# Patient Record
Sex: FEMALE | Race: OTHER | NOT HISPANIC OR LATINO | URBAN - METROPOLITAN AREA
[De-identification: names, ages, dates, MRNs, and addresses within clinical notes are randomized per-mention and may not be internally consistent; named-entity substitution may affect disease eponyms.]

---

## 2024-06-16 ENCOUNTER — EMERGENCY (EMERGENCY)
Facility: HOSPITAL | Age: 54
LOS: 1 days | Discharge: HOME | End: 2024-06-16
Attending: STUDENT IN AN ORGANIZED HEALTH CARE EDUCATION/TRAINING PROGRAM
Payer: COMMERCIAL

## 2024-06-16 VITALS
SYSTOLIC BLOOD PRESSURE: 169 MMHG | TEMPERATURE: 98 F | DIASTOLIC BLOOD PRESSURE: 90 MMHG | HEART RATE: 74 BPM | OXYGEN SATURATION: 100 % | RESPIRATION RATE: 18 BRPM

## 2024-06-17 VITALS
SYSTOLIC BLOOD PRESSURE: 157 MMHG | TEMPERATURE: 98 F | HEART RATE: 66 BPM | OXYGEN SATURATION: 100 % | RESPIRATION RATE: 18 BRPM | DIASTOLIC BLOOD PRESSURE: 84 MMHG

## 2024-06-17 RX ORDER — SODIUM CHLORIDE 9 MG/ML
1000 INJECTION INTRAMUSCULAR; INTRAVENOUS; SUBCUTANEOUS ONCE
Refills: 0 | Status: COMPLETED | OUTPATIENT
Start: 2024-06-17 | End: 2024-06-17

## 2024-06-17 RX ORDER — ACETAMINOPHEN 500 MG
650 TABLET ORAL ONCE
Refills: 0 | Status: COMPLETED | OUTPATIENT
Start: 2024-06-17 | End: 2024-06-17

## 2024-06-17 RX ORDER — METOCLOPRAMIDE HCL 10 MG
10 TABLET ORAL ONCE
Refills: 0 | Status: COMPLETED | OUTPATIENT
Start: 2024-06-17 | End: 2024-06-17

## 2024-06-17 RX ORDER — KETOROLAC TROMETHAMINE 30 MG/ML
30 SYRINGE (ML) INJECTION ONCE
Refills: 0 | Status: DISCONTINUED | OUTPATIENT
Start: 2024-06-17 | End: 2024-06-17

## 2024-06-17 RX ADMIN — Medication 650 MILLIGRAM(S): at 03:41

## 2024-06-17 RX ADMIN — SODIUM CHLORIDE 1000 MILLILITER(S): 9 INJECTION INTRAMUSCULAR; INTRAVENOUS; SUBCUTANEOUS at 01:02

## 2024-06-17 RX ADMIN — Medication 650 MILLIGRAM(S): at 01:00

## 2024-06-17 RX ADMIN — Medication 10 MILLIGRAM(S): at 01:02

## 2024-06-17 RX ADMIN — SODIUM CHLORIDE 1000 MILLILITER(S): 9 INJECTION INTRAMUSCULAR; INTRAVENOUS; SUBCUTANEOUS at 03:41

## 2024-06-17 NOTE — ED PROVIDER NOTE - CLINICAL SUMMARY MEDICAL DECISION MAKING FREE TEXT BOX
53-year-old female, history of asthma presenting with chief complaint of hypertension and headache.  Patient endorses headache to the top of the head, generalized, described as intermittent in nature.  Patient states that she has had this type of headache before, after receiving the Pf[employer] vaccine last year. States that systolic blood pressure was 160 at home.  She denies any neck pain, cough, visual disturbances, nausea, vomiting, fever or chills.    Denies any head trauma or injury.   GCS 15, nonfocal neuroexam.  Suspect tension versus migraine, will give headache cocktail and reassess.  No visual disturbances, chest pain, shortness of breath, or changes in urination to suggest endorgan injury from hypertension.  Suspect elevated BP secondary to ongoing headache, will repeat BP after medication administration.

## 2024-06-17 NOTE — ED PROVIDER NOTE - OBJECTIVE STATEMENT
53-year-old female, history of asthma presenting with chief complaint of hypertension and headache.  Patient endorses headache to the top of the head, generalized, described as intermittent in nature.  Patient states that she has had this type of headache before, after receiving the Pf[employer] vaccine last year. States that systolic blood pressure was 160 at home.  She denies any neck pain, cough, visual disturbances, nausea, vomiting, fever or chills.    Denies any head trauma or injury.

## 2024-06-17 NOTE — ED PROVIDER NOTE - NSFOLLOWUPINSTRUCTIONS_ED_ALL_ED_FT
– Return for any worsening or concerning symptoms (see below).  – Follow up with primary care doctor in the next 5 to 7 days.     Headache    A headache is pain or discomfort felt around the head or neck area. The specific cause of a headache may not be found as there are many types including tension headaches, migraine headaches, and cluster headaches. Watch your condition for any changes. Things you can do to manage your pain include taking over the counter and prescription medications as instructed by your health care provider, lying down in a dark quiet room, limiting stress, getting regular sleep, and refraining from alcohol and tobacco products.    SEEK IMMEDIATE MEDICAL CARE IF YOU HAVE ANY OF THE FOLLOWING SYMPTOMS: fever, vomiting, stiff neck, loss of vision, problems with speech, muscle weakness, loss of balance, trouble walking, passing out, or confusion.

## 2024-06-17 NOTE — ED PROVIDER NOTE - PATIENT PORTAL LINK FT
You can access the FollowMyHealth Patient Portal offered by [facility] by registering at the following website: [email or web address]. By joining [facility]’s FollowMyHealth portal, you will also be able to view your health information using other applications (apps) compatible with our system.

## 2024-06-17 NOTE — ED PROVIDER NOTE - PHYSICAL EXAMINATION
Gen: NAD; well appearing  Head: NCAT  Eyes: EOMI, PERRLA  Resp: CTAB, no W/R/R  CV: RRR, +S1/S2, no M/R/G  GI: Abdomen soft non-distended, NTTP, no masses  Neuro: A&Ox4, sensation nl, motor 5/5 RUE/LUE/RLE/LLE, follows commands  Skin: no rash or bruising
